# Patient Record
Sex: MALE | Race: WHITE | Employment: FULL TIME | URBAN - METROPOLITAN AREA
[De-identification: names, ages, dates, MRNs, and addresses within clinical notes are randomized per-mention and may not be internally consistent; named-entity substitution may affect disease eponyms.]

---

## 2018-04-11 ENCOUNTER — OFFICE VISIT (OUTPATIENT)
Dept: FAMILY MEDICINE CLINIC | Age: 23
End: 2018-04-11
Payer: COMMERCIAL

## 2018-04-11 VITALS
BODY MASS INDEX: 25.18 KG/M2 | SYSTOLIC BLOOD PRESSURE: 120 MMHG | OXYGEN SATURATION: 97 % | HEIGHT: 69 IN | DIASTOLIC BLOOD PRESSURE: 80 MMHG | HEART RATE: 87 BPM | WEIGHT: 170 LBS | RESPIRATION RATE: 18 BRPM | TEMPERATURE: 98.5 F

## 2018-04-11 DIAGNOSIS — J10.1 INFLUENZA A: Primary | ICD-10-CM

## 2018-04-11 LAB
INFLUENZA A ANTIBODY: POSITIVE
INFLUENZA B ANTIBODY: NEGATIVE

## 2018-04-11 PROCEDURE — 87804 INFLUENZA ASSAY W/OPTIC: CPT | Performed by: FAMILY MEDICINE

## 2018-04-11 PROCEDURE — 99213 OFFICE O/P EST LOW 20 MIN: CPT | Performed by: FAMILY MEDICINE

## 2018-04-11 RX ORDER — OSELTAMIVIR PHOSPHATE 75 MG/1
75 CAPSULE ORAL 2 TIMES DAILY
Qty: 10 CAPSULE | Refills: 0 | Status: SHIPPED | OUTPATIENT
Start: 2018-04-11 | End: 2018-04-16

## 2018-09-26 ENCOUNTER — OFFICE VISIT (OUTPATIENT)
Dept: FAMILY MEDICINE CLINIC | Age: 23
End: 2018-09-26
Payer: COMMERCIAL

## 2018-09-26 ENCOUNTER — HOSPITAL ENCOUNTER (OUTPATIENT)
Age: 23
Discharge: HOME | End: 2018-09-28
Payer: COMMERCIAL

## 2018-09-26 VITALS
SYSTOLIC BLOOD PRESSURE: 122 MMHG | WEIGHT: 184 LBS | TEMPERATURE: 97.2 F | DIASTOLIC BLOOD PRESSURE: 80 MMHG | RESPIRATION RATE: 18 BRPM | HEIGHT: 69 IN | BODY MASS INDEX: 27.25 KG/M2 | HEART RATE: 104 BPM | OXYGEN SATURATION: 96 %

## 2018-09-26 DIAGNOSIS — J02.9 ACUTE PHARYNGITIS, UNSPECIFIED ETIOLOGY: Primary | ICD-10-CM

## 2018-09-26 DIAGNOSIS — J01.90 ACUTE NON-RECURRENT SINUSITIS, UNSPECIFIED LOCATION: ICD-10-CM

## 2018-09-26 LAB — S PYO AG THROAT QL: NORMAL

## 2018-09-26 PROCEDURE — 99213 OFFICE O/P EST LOW 20 MIN: CPT | Performed by: PHYSICIAN ASSISTANT

## 2018-09-26 PROCEDURE — 87147 CULTURE TYPE IMMUNOLOGIC: CPT

## 2018-09-26 PROCEDURE — 87880 STREP A ASSAY W/OPTIC: CPT | Performed by: PHYSICIAN ASSISTANT

## 2018-09-26 PROCEDURE — 87081 CULTURE SCREEN ONLY: CPT

## 2018-09-26 RX ORDER — AZITHROMYCIN 250 MG/1
TABLET, FILM COATED ORAL
Qty: 1 PACKET | Refills: 0 | Status: SHIPPED | OUTPATIENT
Start: 2018-09-26 | End: 2019-02-27

## 2018-09-26 ASSESSMENT — PATIENT HEALTH QUESTIONNAIRE - PHQ9
1. LITTLE INTEREST OR PLEASURE IN DOING THINGS: 0
2. FEELING DOWN, DEPRESSED OR HOPELESS: 0
SUM OF ALL RESPONSES TO PHQ QUESTIONS 1-9: 0
SUM OF ALL RESPONSES TO PHQ QUESTIONS 1-9: 0
SUM OF ALL RESPONSES TO PHQ9 QUESTIONS 1 & 2: 0

## 2018-09-28 LAB — S PYO THROAT QL CULT: NORMAL

## 2019-02-27 ENCOUNTER — APPOINTMENT (OUTPATIENT)
Dept: CT IMAGING | Age: 24
End: 2019-02-27
Payer: COMMERCIAL

## 2019-02-27 ENCOUNTER — APPOINTMENT (OUTPATIENT)
Dept: GENERAL RADIOLOGY | Age: 24
End: 2019-02-27
Payer: COMMERCIAL

## 2019-02-27 ENCOUNTER — HOSPITAL ENCOUNTER (EMERGENCY)
Age: 24
Discharge: HOME | End: 2019-02-28
Attending: EMERGENCY MEDICINE
Payer: COMMERCIAL

## 2019-02-27 DIAGNOSIS — I10 HYPERTENSION, UNSPECIFIED TYPE: ICD-10-CM

## 2019-02-27 DIAGNOSIS — R07.9 CHEST PAIN, UNSPECIFIED TYPE: Primary | ICD-10-CM

## 2019-02-27 LAB
ACETAMINOPHEN LEVEL: <5 MCG/ML (ref 10–30)
AMPHETAMINE SCREEN, URINE: NOT DETECTED
BARBITURATE SCREEN URINE: NOT DETECTED
BENZODIAZEPINE SCREEN, URINE: NOT DETECTED
CANNABINOID SCREEN URINE: NOT DETECTED
COCAINE METABOLITE SCREEN URINE: NOT DETECTED
D DIMER: <200 NG/ML DDU
ETHANOL: <10 MG/DL (ref 0–0.08)
HCT VFR BLD CALC: 46.9 % (ref 37–54)
HEMOGLOBIN: 16.3 G/DL (ref 12.5–16.5)
MCH RBC QN AUTO: 28.2 PG (ref 26–35)
MCHC RBC AUTO-ENTMCNC: 34.8 % (ref 32–34.5)
MCV RBC AUTO: 81.1 FL (ref 80–99.9)
METHADONE SCREEN, URINE: NOT DETECTED
OPIATE SCREEN URINE: NOT DETECTED
PDW BLD-RTO: 11.9 FL (ref 11.5–15)
PHENCYCLIDINE SCREEN URINE: NOT DETECTED
PLATELET # BLD: 300 E9/L (ref 130–450)
PMV BLD AUTO: 8.1 FL (ref 7–12)
PROPOXYPHENE SCREEN: NOT DETECTED
RBC # BLD: 5.78 E12/L (ref 3.8–5.8)
SALICYLATE, SERUM: 0.4 MG/DL (ref 0–30)
TRICYCLIC ANTIDEPRESSANTS SCREEN SERUM: NEGATIVE NG/ML
TROPONIN: <0.01 NG/ML (ref 0–0.03)
WBC # BLD: 9.4 E9/L (ref 4.5–11.5)

## 2019-02-27 PROCEDURE — 70450 CT HEAD/BRAIN W/O DYE: CPT

## 2019-02-27 PROCEDURE — G0480 DRUG TEST DEF 1-7 CLASSES: HCPCS

## 2019-02-27 PROCEDURE — 80053 COMPREHEN METABOLIC PANEL: CPT

## 2019-02-27 PROCEDURE — 6370000000 HC RX 637 (ALT 250 FOR IP): Performed by: EMERGENCY MEDICINE

## 2019-02-27 PROCEDURE — 93005 ELECTROCARDIOGRAM TRACING: CPT | Performed by: EMERGENCY MEDICINE

## 2019-02-27 PROCEDURE — 36415 COLL VENOUS BLD VENIPUNCTURE: CPT

## 2019-02-27 PROCEDURE — 85378 FIBRIN DEGRADE SEMIQUANT: CPT

## 2019-02-27 PROCEDURE — 84484 ASSAY OF TROPONIN QUANT: CPT

## 2019-02-27 PROCEDURE — 85027 COMPLETE CBC AUTOMATED: CPT

## 2019-02-27 PROCEDURE — 80307 DRUG TEST PRSMV CHEM ANLYZR: CPT

## 2019-02-27 PROCEDURE — 71045 X-RAY EXAM CHEST 1 VIEW: CPT

## 2019-02-27 PROCEDURE — 99285 EMERGENCY DEPT VISIT HI MDM: CPT

## 2019-02-27 RX ORDER — CLONIDINE HYDROCHLORIDE 0.1 MG/1
0.1 TABLET ORAL ONCE
Status: COMPLETED | OUTPATIENT
Start: 2019-02-27 | End: 2019-02-27

## 2019-02-27 RX ADMIN — CLONIDINE HYDROCHLORIDE 0.1 MG: 0.1 TABLET ORAL at 23:35

## 2019-02-28 VITALS
RESPIRATION RATE: 16 BRPM | TEMPERATURE: 98.9 F | SYSTOLIC BLOOD PRESSURE: 137 MMHG | DIASTOLIC BLOOD PRESSURE: 88 MMHG | HEART RATE: 82 BPM | OXYGEN SATURATION: 98 %

## 2019-02-28 LAB
ALBUMIN SERPL-MCNC: 4.9 G/DL (ref 3.5–5.2)
ALP BLD-CCNC: 51 U/L (ref 40–129)
ALT SERPL-CCNC: 16 U/L (ref 0–40)
ANION GAP SERPL CALCULATED.3IONS-SCNC: 13 MMOL/L (ref 7–16)
AST SERPL-CCNC: 19 U/L (ref 0–39)
BILIRUB SERPL-MCNC: 0.9 MG/DL (ref 0–1.2)
BUN BLDV-MCNC: 13 MG/DL (ref 6–20)
CALCIUM SERPL-MCNC: 9.6 MG/DL (ref 8.6–10.2)
CHLORIDE BLD-SCNC: 98 MMOL/L (ref 98–107)
CO2: 28 MMOL/L (ref 22–29)
CREAT SERPL-MCNC: 1 MG/DL (ref 0.7–1.2)
GFR AFRICAN AMERICAN: >60
GFR NON-AFRICAN AMERICAN: >60 ML/MIN/1.73
GLUCOSE BLD-MCNC: 95 MG/DL (ref 74–99)
POTASSIUM SERPL-SCNC: 3.4 MMOL/L (ref 3.5–5)
SODIUM BLD-SCNC: 139 MMOL/L (ref 132–146)
TOTAL PROTEIN: 8 G/DL (ref 6.4–8.3)

## 2019-02-28 RX ORDER — AMLODIPINE BESYLATE 5 MG/1
5 TABLET ORAL DAILY
Qty: 7 TABLET | Refills: 0 | Status: SHIPPED | OUTPATIENT
Start: 2019-02-28 | End: 2019-03-04

## 2019-03-01 LAB
EKG ATRIAL RATE: 88 BPM
EKG P AXIS: 70 DEGREES
EKG P-R INTERVAL: 140 MS
EKG Q-T INTERVAL: 338 MS
EKG QRS DURATION: 80 MS
EKG QTC CALCULATION (BAZETT): 408 MS
EKG R AXIS: 73 DEGREES
EKG T AXIS: 17 DEGREES
EKG VENTRICULAR RATE: 88 BPM

## 2019-03-04 ENCOUNTER — OFFICE VISIT (OUTPATIENT)
Dept: FAMILY MEDICINE CLINIC | Age: 24
End: 2019-03-04
Payer: COMMERCIAL

## 2019-03-04 VITALS
BODY MASS INDEX: 28.55 KG/M2 | HEIGHT: 68 IN | HEART RATE: 98 BPM | SYSTOLIC BLOOD PRESSURE: 138 MMHG | RESPIRATION RATE: 16 BRPM | OXYGEN SATURATION: 97 % | DIASTOLIC BLOOD PRESSURE: 80 MMHG | WEIGHT: 188.4 LBS | TEMPERATURE: 98.5 F

## 2019-03-04 DIAGNOSIS — F41.9 ANXIETY: Primary | ICD-10-CM

## 2019-03-04 PROCEDURE — 99213 OFFICE O/P EST LOW 20 MIN: CPT | Performed by: PHYSICIAN ASSISTANT

## 2019-03-04 RX ORDER — CITALOPRAM 10 MG/1
10 TABLET ORAL DAILY
Qty: 30 TABLET | Refills: 0 | Status: SHIPPED | OUTPATIENT
Start: 2019-03-04 | End: 2019-03-15

## 2019-03-04 ASSESSMENT — PATIENT HEALTH QUESTIONNAIRE - PHQ9
SUM OF ALL RESPONSES TO PHQ QUESTIONS 1-9: 0
SUM OF ALL RESPONSES TO PHQ QUESTIONS 1-9: 0
1. LITTLE INTEREST OR PLEASURE IN DOING THINGS: 0
2. FEELING DOWN, DEPRESSED OR HOPELESS: 0
SUM OF ALL RESPONSES TO PHQ9 QUESTIONS 1 & 2: 0

## 2019-03-05 ASSESSMENT — ENCOUNTER SYMPTOMS
NAUSEA: 0
SINUS PAIN: 0
ABDOMINAL PAIN: 0
DIARRHEA: 0
SINUS PRESSURE: 0
VOMITING: 0
COUGH: 0
SHORTNESS OF BREATH: 0
SORE THROAT: 0

## 2019-03-14 ENCOUNTER — TELEPHONE (OUTPATIENT)
Dept: FAMILY MEDICINE CLINIC | Age: 24
End: 2019-03-14

## 2019-03-15 RX ORDER — CITALOPRAM 20 MG/1
20 TABLET ORAL DAILY
Qty: 30 TABLET | Refills: 0 | Status: SHIPPED | OUTPATIENT
Start: 2019-03-15 | End: 2019-03-29 | Stop reason: SDUPTHER

## 2019-03-29 ENCOUNTER — OFFICE VISIT (OUTPATIENT)
Dept: FAMILY MEDICINE CLINIC | Age: 24
End: 2019-03-29
Payer: COMMERCIAL

## 2019-03-29 VITALS
OXYGEN SATURATION: 97 % | DIASTOLIC BLOOD PRESSURE: 80 MMHG | RESPIRATION RATE: 18 BRPM | HEART RATE: 79 BPM | SYSTOLIC BLOOD PRESSURE: 124 MMHG | WEIGHT: 187 LBS | HEIGHT: 68 IN | BODY MASS INDEX: 28.34 KG/M2 | TEMPERATURE: 97.9 F

## 2019-03-29 DIAGNOSIS — F41.9 ANXIETY: ICD-10-CM

## 2019-03-29 PROCEDURE — 99213 OFFICE O/P EST LOW 20 MIN: CPT | Performed by: FAMILY MEDICINE

## 2019-03-29 RX ORDER — CITALOPRAM 20 MG/1
20 TABLET ORAL DAILY
Qty: 30 TABLET | Refills: 5 | Status: SHIPPED | OUTPATIENT
Start: 2019-03-29 | End: 2019-05-21 | Stop reason: SDUPTHER

## 2019-03-29 NOTE — PROGRESS NOTES
4/1/2019    Chief Complaint   Patient presents with    Anxiety     1 month medication check up          Review of Systems   Constitutional: Positive for activity change. Negative for diaphoresis. HENT: Negative. Eyes: Negative. Respiratory: Negative for cough, choking, chest tightness and shortness of breath. Gastrointestinal: Negative for abdominal distention and abdominal pain. Endocrine: Negative. Genitourinary: Negative. Musculoskeletal: Negative for arthralgias, back pain, gait problem and joint swelling. Skin: Negative for color change and pallor. Hematological: Negative. Psychiatric/Behavioral: Positive for sleep disturbance. Negative for self-injury and suicidal ideas. The patient is nervous/anxious. Physical Exam   Constitutional: He appears well-developed and well-nourished. HENT:   Head: Normocephalic. Eyes: Pupils are equal, round, and reactive to light. Neck: Normal range of motion. Cardiovascular: Normal rate, regular rhythm and normal heart sounds. Pulmonary/Chest: Effort normal and breath sounds normal.   Abdominal: Soft. Bowel sounds are normal.   Musculoskeletal: Normal range of motion. Neurological: He is alert. Psychiatric: He has a normal mood and affect.  His behavior is normal. Judgment and thought content normal.     1. Anxiety  Started on celexa 10 2 weeks ago  No side effects  Is feeling better  rechecl 4 weeks

## 2019-04-01 PROBLEM — F41.9 ANXIETY: Status: ACTIVE | Noted: 2019-04-01

## 2019-04-01 ASSESSMENT — ENCOUNTER SYMPTOMS
EYES NEGATIVE: 1
CHOKING: 0
SHORTNESS OF BREATH: 0
ABDOMINAL PAIN: 0
CHEST TIGHTNESS: 0
BACK PAIN: 0
ABDOMINAL DISTENTION: 0
COUGH: 0
COLOR CHANGE: 0

## 2019-05-22 RX ORDER — CITALOPRAM 20 MG/1
20 TABLET ORAL DAILY
Qty: 90 TABLET | Refills: 1 | Status: SHIPPED | OUTPATIENT
Start: 2019-05-22 | End: 2019-08-29 | Stop reason: SDUPTHER

## 2019-06-11 ENCOUNTER — TELEPHONE (OUTPATIENT)
Dept: FAMILY MEDICINE CLINIC | Age: 24
End: 2019-06-11

## 2019-06-11 RX ORDER — AZITHROMYCIN 500 MG/1
500 TABLET, FILM COATED ORAL DAILY
Qty: 6 TABLET | Refills: 0 | Status: SHIPPED | OUTPATIENT
Start: 2019-06-11 | End: 2019-06-17

## 2019-06-11 NOTE — TELEPHONE ENCOUNTER
His girlfriend tested Positive yesterday for Strep. Wanted to know if you would call something in for him because he has a sore throat also. [facility] [address].

## 2019-08-29 RX ORDER — CITALOPRAM 20 MG/1
20 TABLET ORAL DAILY
Qty: 30 TABLET | Refills: 0 | Status: SHIPPED | OUTPATIENT
Start: 2019-08-29 | End: 2019-09-27 | Stop reason: SDUPTHER

## 2019-09-27 ENCOUNTER — OFFICE VISIT (OUTPATIENT)
Dept: FAMILY MEDICINE CLINIC | Age: 24
End: 2019-09-27
Payer: COMMERCIAL

## 2019-09-27 VITALS
TEMPERATURE: 98.2 F | OXYGEN SATURATION: 99 % | DIASTOLIC BLOOD PRESSURE: 82 MMHG | RESPIRATION RATE: 16 BRPM | WEIGHT: 194.4 LBS | SYSTOLIC BLOOD PRESSURE: 138 MMHG | BODY MASS INDEX: 29.56 KG/M2 | HEART RATE: 80 BPM

## 2019-09-27 DIAGNOSIS — R45.4 EXCESSIVE ANGER: ICD-10-CM

## 2019-09-27 DIAGNOSIS — F41.9 ANXIETY: Primary | ICD-10-CM

## 2019-09-27 PROCEDURE — 99213 OFFICE O/P EST LOW 20 MIN: CPT | Performed by: FAMILY MEDICINE

## 2019-09-27 RX ORDER — CITALOPRAM 40 MG/1
40 TABLET ORAL DAILY
Qty: 90 TABLET | Refills: 1 | Status: SHIPPED
Start: 2019-09-27 | End: 2020-02-14 | Stop reason: SDUPTHER

## 2019-09-27 ASSESSMENT — ENCOUNTER SYMPTOMS
RESPIRATORY NEGATIVE: 1
GASTROINTESTINAL NEGATIVE: 1

## 2019-09-27 NOTE — PROGRESS NOTES
strain: Not on file    Food insecurity:     Worry: Not on file     Inability: Not on file    Transportation needs:     Medical: Not on file     Non-medical: Not on file   Tobacco Use    Smoking status: Never Smoker    Smokeless tobacco: Former User   Substance and Sexual Activity    Alcohol use: Yes    Drug use: No    Sexual activity: Yes     Partners: Female   Lifestyle    Physical activity:     Days per week: Not on file     Minutes per session: Not on file    Stress: Not on file   Relationships    Social connections:     Talks on phone: Not on file     Gets together: Not on file     Attends Quaker service: Not on file     Active member of club or organization: Not on file     Attends meetings of clubs or organizations: Not on file     Relationship status: Not on file    Intimate partner violence:     Fear of current or ex partner: Not on file     Emotionally abused: Not on file     Physically abused: Not on file     Forced sexual activity: Not on file   Other Topics Concern    Not on file   Social History Narrative    Not on file           Scheduled Meds:  Current Outpatient Medications   Medication Sig Dispense Refill    citalopram (CELEXA) 40 MG tablet Take 1 tablet by mouth daily 90 tablet 1     No current facility-administered medications for this visit. Wt Readings from Last 3 Encounters:   09/27/19 194 lb 6.4 oz (88.2 kg)   03/29/19 187 lb (84.8 kg)   03/04/19 188 lb 6.4 oz (85.5 kg)         Vitals:    09/27/19 1611   BP: 138/82   Pulse:    Resp:    Temp:    SpO2:          Physical Exam   Constitutional: He is oriented to person, place, and time. He appears well-developed and well-nourished. HENT:   Head: Normocephalic. Eyes: Pupils are equal, round, and reactive to light. Neck: Normal range of motion. Cardiovascular: Normal rate and regular rhythm. Pulmonary/Chest: Effort normal and breath sounds normal.   Abdominal: Soft.  Bowel sounds are normal.

## 2019-12-30 ENCOUNTER — OFFICE VISIT (OUTPATIENT)
Dept: FAMILY MEDICINE CLINIC | Age: 24
End: 2019-12-30
Payer: COMMERCIAL

## 2019-12-30 VITALS
OXYGEN SATURATION: 96 % | HEART RATE: 82 BPM | RESPIRATION RATE: 16 BRPM | SYSTOLIC BLOOD PRESSURE: 126 MMHG | DIASTOLIC BLOOD PRESSURE: 80 MMHG | HEIGHT: 68 IN | TEMPERATURE: 97.8 F | WEIGHT: 197.2 LBS | BODY MASS INDEX: 29.89 KG/M2

## 2019-12-30 PROCEDURE — 99213 OFFICE O/P EST LOW 20 MIN: CPT | Performed by: FAMILY MEDICINE

## 2019-12-30 NOTE — PROGRESS NOTES
1/2/2020    Chief Complaint   Patient presents with    Anxiety     Pt here for 3 month medication check              Patient Active Problem List    Diagnosis Date Noted    Excessive anger 09/27/2019    Anxiety 04/01/2019     Subjective    On celexa and feeling better but readily admits he has anger issues, stemming from how he grew up  Does not feel counseling would help    States he left [place] party because someone set him off  He just feels that this is how he is and not changeable. 1. Excessive anger      2.  Anxiety          Goals    None       Goals      Review of Systems     Review Of Systems    General:   No weight change no malaise no fatigue no change in appetite no sleep disturbance nofever/chills no night sweats  Skin:                no abnormal pigmentation, no rash, no scaling,no itching, no Masses,no  hair , no nail changes  Eyes:               no blurring, no diplopia, no  eye pain no glaucoma no cataracts  ENT:                 no hearing loss,no  Tinnitus,no  Vertigo,no osebleed, no nasal congestion, no rhinorrhea, no sore throat , no jaw adam. no hoarseness,  no bleeding    Neck:     no node tenderness , not rigid, no masses   Respiratory:           no cough, no sputum, No coughing blood, no pleuritic , no chest pain, no dyspnea,  no wheezing  Cardiovascular:         no angina, No chest pain  No syncope, no pedal edema , no orthopnea, no PND, no palpitations, no claudication  Gastrointestinal  no nausea, no vomiting, no heartburn, no diarrhea, no constipation, no bloating,  no abdominal pain, no rectal pain, no bleeding no hemorrhoids, no hernia  no urinary urgency, no frequency, no dysuria, no nocturia, no hesitancy, no  Incontinence, no bleeding, no stones  Musculoskeletal:        no arthritis, no  arthralgia, no myalgia, no  weakness,  no morning stiffness, no joint swelling   Neurologic:                 no paralysis, no paresis, no  paresthesia, no seizures, no  tremors, no headaches, no tumors , no stroke, no speech issues,  No incoordination, no head trauma, no memory loss/concentration  Hematologic:              no anemia, no abnormal bleeding/bruising, no fever, no chills, no night sweats, no wollen glands, no unexplained weight loss  Endocrine:        no heat or cold intolerance and no polyphagia, polydipsia,  or polyuria  Psych:   No depression no anxiety, no suicidal or homicidal thoughts, no irritability, no decreased energy, no trouble falling asleep, no early awakening , no trouble concentrating             Objective:    No family history on file. No past medical history on file. Social History     Socioeconomic History    Marital status: Single     Spouse name: Not on file    Number of children: Not on file    Years of education: Not on file    Highest education level: Not on file   Occupational History    Not on file   Social Needs    Financial resource strain: Not on file    Food insecurity:     Worry: Not on file     Inability: Not on file    Transportation needs:     Medical: Not on file     Non-medical: Not on file   Tobacco Use    Smoking status: Never Smoker    Smokeless tobacco: Former User   Substance and Sexual Activity    Alcohol use:  Yes    Drug use: No    Sexual activity: Yes     Partners: Female   Lifestyle    Physical activity:     Days per week: Not on file     Minutes per session: Not on file    Stress: Not on file   Relationships    Social connections:     Talks on phone: Not on file     Gets together: Not on file     Attends Christian service: Not on file     Active member of club or organization: Not on file     Attends meetings of clubs or organizations: Not on file     Relationship status: Not on file    Intimate partner violence:     Fear of current or ex partner: Not on file     Emotionally abused: Not on file     Physically abused: Not on file     Forced sexual activity: Not on file   Other Topics Concern    Not on file   Social History Narrative    Not on file           Scheduled Meds:  Current Outpatient Medications   Medication Sig Dispense Refill    citalopram (CELEXA) 40 MG tablet Take 1 tablet by mouth daily 90 tablet 1     No current facility-administered medications for this visit.                 Wt Readings from Last 3 Encounters:   12/30/19 197 lb 3.2 oz (89.4 kg)   09/27/19 194 lb 6.4 oz (88.2 kg)   03/29/19 187 lb (84.8 kg)         Vitals:    12/30/19 1658   BP: 126/80   Pulse: 82   Resp: 16   Temp: 97.8 °F (36.6 °C)   SpO2: 96%         Physical Exam    Physical Exam  General Appearance: alert and oriented to person, place and time, well developed and well- nourished, in no acute distress  Skin:    warm and dry, no rash or erythema  Head:    normocephalic and atraumatic  Eyes:    pupils equal, round, and reactive to light, extraocular eye movements intact, conjunctivae normal  ENT:    tympanic membrane, external ear and ear canal normal bilaterally, nose without deformity, nasal mucosa and turbinates     normal without polyps  Neck:    supple and non-tender without mass, no thyromegaly or thyroid nodules, no cervical lymphadenopathy  Pulmonary/Chest:  clear to auscultation bilaterally- no wheezes, rales or rhonchi, normal air movement, no respiratory distress  Cardiovascular:  normal rate, regular rhythm, , no murmurs, rubs, clicks, or gallops, distal pulses intact, no carotid bruits  Abdomen:   soft, non-tender, non-distended, normal bowel sounds, no masses or organomegaly, normal palpation  Extremities:   no cyanosis, clubbing or edema, pedal pulses normal  Musculoskeletal:  normal range of motion, no joint swelling, deformity or tenderness, Back normal, no kyphosis, no cva tenderness,  Head and neck normal rom, shoulders normal strength ,  hips normal , gait normal , no cane no walker , no motor deficits,  Neurologic:   reflexes normal and symmetric, no cranial nerve deficit, gait, coordination and speech normal, A & O x 3, Moves all extremities,  No gross neuro deficits ,normal DTR, normal memory, normal judgement/insight, normal affect                                       Assessment:    1. Excessive anger  Advise counseling and card given    2. Anxiety  Continue meds recheck 6 mos                        4. Reviewed labs    5. Return in about 6 months (around 6/30/2020).            Augusto Canavan M.D.    1/2/2020

## 2020-02-14 RX ORDER — CITALOPRAM 40 MG/1
40 TABLET ORAL DAILY
Qty: 90 TABLET | Refills: 1 | Status: SHIPPED
Start: 2020-02-14 | End: 2020-05-22 | Stop reason: SDUPTHER

## 2020-05-22 ENCOUNTER — VIRTUAL VISIT (OUTPATIENT)
Dept: FAMILY MEDICINE CLINIC | Age: 25
End: 2020-05-22
Payer: COMMERCIAL

## 2020-05-22 PROCEDURE — 99213 OFFICE O/P EST LOW 20 MIN: CPT | Performed by: FAMILY MEDICINE

## 2020-05-22 RX ORDER — CITALOPRAM 40 MG/1
40 TABLET ORAL DAILY
Qty: 90 TABLET | Refills: 1 | Status: SHIPPED
Start: 2020-05-22 | End: 2020-10-15 | Stop reason: SDUPTHER

## 2020-06-01 ASSESSMENT — ENCOUNTER SYMPTOMS: GASTROINTESTINAL NEGATIVE: 1

## 2020-10-15 RX ORDER — CITALOPRAM 40 MG/1
40 TABLET ORAL DAILY
Qty: 90 TABLET | Refills: 1 | Status: SHIPPED
Start: 2020-10-15 | End: 2021-02-08 | Stop reason: SDUPTHER

## 2021-02-08 RX ORDER — CITALOPRAM 40 MG/1
40 TABLET ORAL DAILY
Qty: 90 TABLET | Refills: 0 | Status: SHIPPED
Start: 2021-02-08 | End: 2022-07-08

## 2022-07-08 ENCOUNTER — OFFICE VISIT (OUTPATIENT)
Dept: FAMILY MEDICINE CLINIC | Age: 27
End: 2022-07-08
Payer: COMMERCIAL

## 2022-07-08 VITALS
DIASTOLIC BLOOD PRESSURE: 70 MMHG | HEIGHT: 68 IN | BODY MASS INDEX: 31.28 KG/M2 | WEIGHT: 206.4 LBS | TEMPERATURE: 97.7 F | OXYGEN SATURATION: 97 % | HEART RATE: 86 BPM | SYSTOLIC BLOOD PRESSURE: 124 MMHG

## 2022-07-08 DIAGNOSIS — F41.9 ANXIETY: ICD-10-CM

## 2022-07-08 DIAGNOSIS — I10 HYPERTENSION, UNSPECIFIED TYPE: ICD-10-CM

## 2022-07-08 DIAGNOSIS — F41.9 ANXIETY: Primary | ICD-10-CM

## 2022-07-08 LAB
BASOPHILS ABSOLUTE: 0.05 E9/L (ref 0–0.2)
BASOPHILS RELATIVE PERCENT: 0.6 % (ref 0–2)
EOSINOPHILS ABSOLUTE: 0.24 E9/L (ref 0.05–0.5)
EOSINOPHILS RELATIVE PERCENT: 2.9 % (ref 0–6)
HCT VFR BLD CALC: 45.8 % (ref 37–54)
HEMOGLOBIN: 15.4 G/DL (ref 12.5–16.5)
IMMATURE GRANULOCYTES #: 0.03 E9/L
IMMATURE GRANULOCYTES %: 0.4 % (ref 0–5)
LYMPHOCYTES ABSOLUTE: 1.87 E9/L (ref 1.5–4)
LYMPHOCYTES RELATIVE PERCENT: 22.3 % (ref 20–42)
MCH RBC QN AUTO: 27.9 PG (ref 26–35)
MCHC RBC AUTO-ENTMCNC: 33.6 % (ref 32–34.5)
MCV RBC AUTO: 83 FL (ref 80–99.9)
MONOCYTES ABSOLUTE: 0.74 E9/L (ref 0.1–0.95)
MONOCYTES RELATIVE PERCENT: 8.8 % (ref 2–12)
NEUTROPHILS ABSOLUTE: 5.47 E9/L (ref 1.8–7.3)
NEUTROPHILS RELATIVE PERCENT: 65 % (ref 43–80)
PDW BLD-RTO: 12.4 FL (ref 11.5–15)
PLATELET # BLD: 297 E9/L (ref 130–450)
PMV BLD AUTO: 9.3 FL (ref 7–12)
RBC # BLD: 5.52 E12/L (ref 3.8–5.8)
WBC # BLD: 8.4 E9/L (ref 4.5–11.5)

## 2022-07-08 PROCEDURE — 99213 OFFICE O/P EST LOW 20 MIN: CPT | Performed by: FAMILY MEDICINE

## 2022-07-08 PROCEDURE — 93000 ELECTROCARDIOGRAM COMPLETE: CPT | Performed by: FAMILY MEDICINE

## 2022-07-08 RX ORDER — METOPROLOL SUCCINATE 50 MG/1
50 TABLET, EXTENDED RELEASE ORAL DAILY
Qty: 30 TABLET | Refills: 3 | Status: SHIPPED
Start: 2022-07-08 | End: 2022-09-02 | Stop reason: SDUPTHER

## 2022-07-08 SDOH — ECONOMIC STABILITY: HOUSING INSECURITY
IN THE LAST 12 MONTHS, WAS THERE A TIME WHEN YOU DID NOT HAVE A STEADY PLACE TO SLEEP OR SLEPT IN A SHELTER (INCLUDING NOW)?: NO

## 2022-07-08 SDOH — ECONOMIC STABILITY: TRANSPORTATION INSECURITY
IN THE PAST 12 MONTHS, HAS LACK OF TRANSPORTATION KEPT YOU FROM MEETINGS, WORK, OR FROM GETTING THINGS NEEDED FOR DAILY LIVING?: NO

## 2022-07-08 SDOH — ECONOMIC STABILITY: INCOME INSECURITY: IN THE LAST 12 MONTHS, WAS THERE A TIME WHEN YOU WERE NOT ABLE TO PAY THE MORTGAGE OR RENT ON TIME?: NO

## 2022-07-08 SDOH — ECONOMIC STABILITY: FOOD INSECURITY: WITHIN THE PAST 12 MONTHS, THE FOOD YOU BOUGHT JUST DIDN'T LAST AND YOU DIDN'T HAVE MONEY TO GET MORE.: NEVER TRUE

## 2022-07-08 SDOH — ECONOMIC STABILITY: TRANSPORTATION INSECURITY
IN THE PAST 12 MONTHS, HAS THE LACK OF TRANSPORTATION KEPT YOU FROM MEDICAL APPOINTMENTS OR FROM GETTING MEDICATIONS?: NO

## 2022-07-08 SDOH — ECONOMIC STABILITY: HOUSING INSECURITY: IN THE LAST 12 MONTHS, HOW MANY PLACES HAVE YOU LIVED?: 1

## 2022-07-08 SDOH — ECONOMIC STABILITY: FOOD INSECURITY: WITHIN THE PAST 12 MONTHS, YOU WORRIED THAT YOUR FOOD WOULD RUN OUT BEFORE YOU GOT MONEY TO BUY MORE.: NEVER TRUE

## 2022-07-08 ASSESSMENT — PATIENT HEALTH QUESTIONNAIRE - PHQ9
SUM OF ALL RESPONSES TO PHQ9 QUESTIONS 1 & 2: 0
SUM OF ALL RESPONSES TO PHQ QUESTIONS 1-9: 0
2. FEELING DOWN, DEPRESSED OR HOPELESS: 0
1. LITTLE INTEREST OR PLEASURE IN DOING THINGS: 0
SUM OF ALL RESPONSES TO PHQ QUESTIONS 1-9: 0

## 2022-07-08 ASSESSMENT — SOCIAL DETERMINANTS OF HEALTH (SDOH): HOW HARD IS IT FOR YOU TO PAY FOR THE VERY BASICS LIKE FOOD, HOUSING, MEDICAL CARE, AND HEATING?: NOT HARD AT ALL

## 2022-07-08 NOTE — PROGRESS NOTES
2022    No current outpatient medications on file. No current facility-administered medications for this visit. Georges Brown (: 1995 IS A 27 y.o. 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 Anxiety  Does not want to go back on celexa as he doesn't think this helped  He feels his relationship  - CBC with Auto Differential; Future  - Comprehensive Metabolic Panel; Future  - TSH; Future   With girlfriend and just getting older has enabled him to  be in better control    2. Hypertension, unspecified type  Vitals:    22 1520   BP: 124/70   Pulse: 86   Temp: 97.7 °F (36.5 °C)   SpO2: 97%       - EKG 12 Lead  - CBC with Auto Differential; Future  - Comprehensive Metabolic Panel; Future  - TSH; Future  - metoprolol succinate (TOPROL XL) 50 MG extended release tablet; Take 1 tablet by mouth daily  Dispense: 30 tablet; Refill: 3    Use toprol to help with decreaseing anxiety and heart rate and bp              SUBJECTIVE    Review of Systems   Constitutional:  Negative for activity change, appetite change, chills, diaphoresis, fatigue, fever and unexpected weight change. HENT:  Negative for hearing loss, sinus pressure, sore throat, tinnitus, trouble swallowing and voice change. Eyes:  Negative for photophobia and visual disturbance. Respiratory:  Negative for cough, chest tightness, shortness of breath and wheezing. Cardiovascular:  Negative for chest pain, palpitations and leg swelling. Gastrointestinal:  Negative for abdominal pain, blood in stool, constipation, diarrhea, nausea and vomiting. Endocrine: Negative for cold intolerance, heat intolerance, polydipsia, polyphagia and polyuria. Genitourinary:  Negative for difficulty urinating, frequency, hematuria and urgency. Musculoskeletal:  Negative for back pain, joint swelling and myalgias. Allergic/Immunologic: Negative for environmental allergies, food allergies and immunocompromised state. Neurological:  Negative for dizziness, weakness, numbness and headaches. Hematological:  Negative for adenopathy. Does not bruise/bleed easily. Psychiatric/Behavioral:  Negative for agitation, behavioral problems, confusion, decreased concentration and sleep disturbance. The patient is not nervous/anxious. All other systems reviewed and are negative. OBJECTIVE    Wt Readings from Last 3 Encounters:   07/08/22 206 lb 6.4 oz (93.6 kg)   12/30/19 197 lb 3.2 oz (89.4 kg)   09/27/19 194 lb 6.4 oz (88.2 kg)       Vitals:    07/08/22 1520   BP: 124/70   Pulse: 86   Temp: 97.7 °F (36.5 °C)   SpO2: 97%       Physical Exam  Vitals and nursing note reviewed. Constitutional:       Appearance: Normal appearance. Cardiovascular:      Rate and Rhythm: Normal rate and regular rhythm. Pulses: Normal pulses. Heart sounds: Normal heart sounds. Pulmonary:      Effort: Pulmonary effort is normal. No respiratory distress. Breath sounds: Normal breath sounds. Abdominal:      General: Bowel sounds are normal. There is no distension. Palpations: Abdomen is soft. Tenderness: There is no abdominal tenderness. Musculoskeletal:         General: No swelling or tenderness. Cervical back: Normal range of motion. No rigidity. Skin:     General: Skin is warm and dry. Neurological:      General: No focal deficit present. Mental Status: He is alert. Psychiatric:         Mood and Affect: Mood normal.         Behavior: Behavior normal.         Thought Content:  Thought content normal.         Judgment: Judgment normal.       No follow-ups on file. An electronic signature was used to authenticate this note.     Hilaria Cain, LUANND    7/8/2022

## 2022-07-09 LAB
ALBUMIN SERPL-MCNC: 4.5 G/DL (ref 3.5–5.2)
ALP BLD-CCNC: 56 U/L (ref 40–129)
ALT SERPL-CCNC: 23 U/L (ref 0–40)
ANION GAP SERPL CALCULATED.3IONS-SCNC: 11 MMOL/L (ref 7–16)
AST SERPL-CCNC: 19 U/L (ref 0–39)
BILIRUB SERPL-MCNC: 0.7 MG/DL (ref 0–1.2)
BUN BLDV-MCNC: 16 MG/DL (ref 6–20)
CALCIUM SERPL-MCNC: 9.2 MG/DL (ref 8.6–10.2)
CHLORIDE BLD-SCNC: 107 MMOL/L (ref 98–107)
CO2: 27 MMOL/L (ref 22–29)
CREAT SERPL-MCNC: 1 MG/DL (ref 0.7–1.2)
GFR AFRICAN AMERICAN: >60
GFR NON-AFRICAN AMERICAN: >60 ML/MIN/1.73
GLUCOSE BLD-MCNC: 73 MG/DL (ref 74–99)
POTASSIUM SERPL-SCNC: 4.1 MMOL/L (ref 3.5–5)
SODIUM BLD-SCNC: 145 MMOL/L (ref 132–146)
TOTAL PROTEIN: 7.4 G/DL (ref 6.4–8.3)
TSH SERPL DL<=0.05 MIU/L-ACNC: 0.82 UIU/ML (ref 0.27–4.2)

## 2022-07-16 ASSESSMENT — ENCOUNTER SYMPTOMS
NAUSEA: 0
VOMITING: 0
COUGH: 0
TROUBLE SWALLOWING: 0
SHORTNESS OF BREATH: 0
WHEEZING: 0
SORE THROAT: 0
SINUS PRESSURE: 0
BLOOD IN STOOL: 0
VOICE CHANGE: 0
DIARRHEA: 0
BACK PAIN: 0
CONSTIPATION: 0
PHOTOPHOBIA: 0
CHEST TIGHTNESS: 0
ABDOMINAL PAIN: 0

## 2022-08-05 ENCOUNTER — OFFICE VISIT (OUTPATIENT)
Dept: FAMILY MEDICINE CLINIC | Age: 27
End: 2022-08-05
Payer: COMMERCIAL

## 2022-08-05 VITALS
HEART RATE: 78 BPM | OXYGEN SATURATION: 97 % | WEIGHT: 205.4 LBS | DIASTOLIC BLOOD PRESSURE: 84 MMHG | HEIGHT: 68 IN | RESPIRATION RATE: 16 BRPM | TEMPERATURE: 97.3 F | SYSTOLIC BLOOD PRESSURE: 126 MMHG | BODY MASS INDEX: 31.13 KG/M2

## 2022-08-05 DIAGNOSIS — F41.9 ANXIETY: Primary | ICD-10-CM

## 2022-08-05 DIAGNOSIS — R45.4 EXCESSIVE ANGER: ICD-10-CM

## 2022-08-05 PROCEDURE — 99213 OFFICE O/P EST LOW 20 MIN: CPT | Performed by: FAMILY MEDICINE

## 2022-08-05 NOTE — PROGRESS NOTES
2022    Current Outpatient Medications   Medication Sig Dispense Refill    metoprolol succinate (TOPROL XL) 50 MG extended release tablet Take 1 tablet by mouth daily 30 tablet 3     No current facility-administered medications for this visit. Kevin Basilio (: 1995 IS A 32 y.o. male ,Established patient, here for eval of Anxiety (Pt is here today to f/u on metoprolol. Pt states he is unsure if the medication is helping at this time. States he has noticed decreased anger. HM reviewed. Blood work was completed on . )    Ends day betwenn -5-6    Very busy but  maybe noting a decrease in anxiety  Ntot ready to try anti depressant  States his girlfreiend noticies some improvement    ASSESSMENT / PLAN      1. Anxiety  Stable, cont meds recheck 6 mos      2. Excessive anger  Stable, cont meds recheck 6 mos              SUBJECTIVE    Review of Systems   Constitutional:  Negative for activity change, appetite change, chills, diaphoresis, fatigue, fever and unexpected weight change. HENT:  Negative for hearing loss, sinus pressure, sore throat, tinnitus, trouble swallowing and voice change. Eyes:  Negative for photophobia and visual disturbance. Respiratory:  Negative for cough, chest tightness, shortness of breath and wheezing. Cardiovascular:  Negative for chest pain, palpitations and leg swelling. Gastrointestinal:  Negative for abdominal pain, blood in stool, constipation, diarrhea, nausea and vomiting. Endocrine: Negative for cold intolerance, heat intolerance, polydipsia, polyphagia and polyuria. Genitourinary:  Negative for difficulty urinating, frequency, hematuria and urgency. Musculoskeletal:  Negative for back pain, joint swelling and myalgias. Allergic/Immunologic: Negative for environmental allergies, food allergies and immunocompromised state. Neurological:  Negative for dizziness, weakness, numbness and headaches. Hematological:  Negative for adenopathy.  Does not bruise/bleed easily. Psychiatric/Behavioral:  Negative for agitation, behavioral problems, confusion, decreased concentration and sleep disturbance. The patient is not nervous/anxious. All other systems reviewed and are negative. OBJECTIVE    Wt Readings from Last 3 Encounters:   08/05/22 205 lb 6.4 oz (93.2 kg)   07/08/22 206 lb 6.4 oz (93.6 kg)   12/30/19 197 lb 3.2 oz (89.4 kg)       Vitals:    08/05/22 1427   BP: 126/84   Pulse: 78   Resp: 16   Temp: 97.3 °F (36.3 °C)   SpO2: 97%       Physical Exam  Vitals and nursing note reviewed. Constitutional:       Appearance: Normal appearance. Cardiovascular:      Rate and Rhythm: Normal rate and regular rhythm. Pulses: Normal pulses. Heart sounds: Normal heart sounds. Pulmonary:      Effort: Pulmonary effort is normal. No respiratory distress. Breath sounds: Normal breath sounds. Abdominal:      General: Bowel sounds are normal. There is no distension. Palpations: Abdomen is soft. Tenderness: no abdominal tenderness   Musculoskeletal:         General: No swelling or tenderness. Cervical back: Normal range of motion. No rigidity. Neurological:      General: No focal deficit present. Mental Status: He is alert. Psychiatric:         Mood and Affect: Mood normal.         Behavior: Behavior normal.         Thought Content: Thought content normal.         Judgment: Judgment normal.       Return in about 3 months (around 11/5/2022). An electronic signature was used to authenticate this note.     Rony Rizzo, CLARA    8/25/2022

## 2022-08-25 ASSESSMENT — ENCOUNTER SYMPTOMS
DIARRHEA: 0
CHEST TIGHTNESS: 0
VOMITING: 0
TROUBLE SWALLOWING: 0
ABDOMINAL PAIN: 0
WHEEZING: 0
SHORTNESS OF BREATH: 0
SINUS PRESSURE: 0
SORE THROAT: 0
CONSTIPATION: 0
COUGH: 0
PHOTOPHOBIA: 0
BACK PAIN: 0
NAUSEA: 0
VOICE CHANGE: 0
BLOOD IN STOOL: 0

## 2022-09-01 DIAGNOSIS — I10 HYPERTENSION, UNSPECIFIED TYPE: ICD-10-CM

## 2022-09-01 NOTE — TELEPHONE ENCOUNTER
Medication Refill Request    LOV Visit date not found  NOV Visit date not found    Lab Results   Component Value Date    CREATININE 1.0 07/08/2022

## 2022-09-02 RX ORDER — METOPROLOL SUCCINATE 50 MG/1
50 TABLET, EXTENDED RELEASE ORAL DAILY
Qty: 30 TABLET | Refills: 1 | Status: SHIPPED | OUTPATIENT
Start: 2022-09-02

## 2022-11-13 DIAGNOSIS — I10 HYPERTENSION, UNSPECIFIED TYPE: ICD-10-CM

## 2022-11-14 RX ORDER — METOPROLOL SUCCINATE 50 MG/1
TABLET, EXTENDED RELEASE ORAL
Qty: 30 TABLET | Refills: 1 | Status: SHIPPED | OUTPATIENT
Start: 2022-11-14

## 2023-01-03 ENCOUNTER — OFFICE VISIT (OUTPATIENT)
Dept: FAMILY MEDICINE CLINIC | Age: 28
End: 2023-01-03
Payer: COMMERCIAL

## 2023-01-03 VITALS
BODY MASS INDEX: 32.28 KG/M2 | HEIGHT: 68 IN | TEMPERATURE: 97.2 F | WEIGHT: 213 LBS | DIASTOLIC BLOOD PRESSURE: 80 MMHG | SYSTOLIC BLOOD PRESSURE: 122 MMHG | OXYGEN SATURATION: 98 % | HEART RATE: 72 BPM

## 2023-01-03 DIAGNOSIS — F41.9 ANXIETY: Primary | ICD-10-CM

## 2023-01-03 DIAGNOSIS — R45.4 EXCESSIVE ANGER: ICD-10-CM

## 2023-01-03 DIAGNOSIS — I10 HYPERTENSION, UNSPECIFIED TYPE: ICD-10-CM

## 2023-01-03 PROCEDURE — 3074F SYST BP LT 130 MM HG: CPT | Performed by: PHYSICIAN ASSISTANT

## 2023-01-03 PROCEDURE — 3079F DIAST BP 80-89 MM HG: CPT | Performed by: PHYSICIAN ASSISTANT

## 2023-01-03 PROCEDURE — 99213 OFFICE O/P EST LOW 20 MIN: CPT | Performed by: PHYSICIAN ASSISTANT

## 2023-01-03 RX ORDER — METOPROLOL SUCCINATE 50 MG/1
TABLET, EXTENDED RELEASE ORAL
Qty: 90 TABLET | Refills: 1 | Status: SHIPPED | OUTPATIENT
Start: 2023-01-03

## 2023-01-03 ASSESSMENT — PATIENT HEALTH QUESTIONNAIRE - PHQ9
SUM OF ALL RESPONSES TO PHQ QUESTIONS 1-9: 0
1. LITTLE INTEREST OR PLEASURE IN DOING THINGS: 0
SUM OF ALL RESPONSES TO PHQ QUESTIONS 1-9: 0
SUM OF ALL RESPONSES TO PHQ9 QUESTIONS 1 & 2: 0
SUM OF ALL RESPONSES TO PHQ QUESTIONS 1-9: 0
SUM OF ALL RESPONSES TO PHQ QUESTIONS 1-9: 0
2. FEELING DOWN, DEPRESSED OR HOPELESS: 0

## 2023-01-03 NOTE — PROGRESS NOTES
Chief Complaint:  Anxiety and Blood Pressure Check    History of Present Illness:  Source of history provided by:  patient. Has noticed a difference with Metoprolol   Anger and anxiety have been decreased  Has been working on keeping himself calm at work  Prefers metoprolol over antidepressant   BP is well controlled today  Denies acute concerns    Review of Systems: Unless otherwise stated in this report or unable to obtain because of the patient's clinical or mental status as evidenced by the medical record, this patients's positive and negative responses for Review of Systems, constitutional, psych, eyes, ENT, cardiovascular, respiratory, gastrointestinal, neurological, genitourinary, musculoskeletal, integument systems and systems related to the presenting problem are either stated in the preceding or were not pertinent or were negative for the symptoms and/or complaints related to the medical problem. Past Medical History:  has no past medical history on file. Past Surgical History:  has no past surgical history on file. Social History:  reports that he has never smoked. He has quit using smokeless tobacco. He reports current alcohol use. He reports that he does not use drugs. Family History: family history is not on file. Allergies: Pcn [penicillins]    Physical Exam:  (Vital signs reviewed) /80   Pulse 72   Temp 97.2 °F (36.2 °C) (Temporal)   Ht 5' 8\" (1.727 m)   Wt 213 lb (96.6 kg)   SpO2 98%   BMI 32.39 kg/m²   Oxygen Saturation Interpretation: Normal.   Constitutional:  Alert, development consistent with age. Eyes:  PERRL, EOMI, no discharge or conjunctival injection. Ears:  External ears without lesions. Neck:  Normal ROM. Supple. Lungs:  Clear to auscultation and breath sounds equal.  Heart:  Regular rate and rhythm, normal heart sounds, without pathological murmurs, ectopy, gallops, or rubs. Skin:  Normal turgor.   Warm, dry, without visible rash, unless noted elsewhere. Neurological:  Alert and oriented. Motor functions intact.    ------------------------------------------Test Results Section----------------------------------------------  (All laboratory and radiology results have been personally reviewed by myself)  Laboratory:      Radiology: All Radiology results interpreted by Radiologist unless otherwise noted. -------------------------------------Impression & Disposition Section-----------------------------------  Impression(s):  Jase Powers was seen today for anxiety and blood pressure check. Diagnoses and all orders for this visit:    Anxiety  Excessive anger  Hypertension, unspecified type  -     metoprolol succinate (TOPROL XL) 50 MG extended release tablet; take 1 tablet by mouth once daily    Return in about 6 months (around 7/3/2023).

## 2023-07-12 ENCOUNTER — OFFICE VISIT (OUTPATIENT)
Dept: FAMILY MEDICINE CLINIC | Age: 28
End: 2023-07-12
Payer: COMMERCIAL

## 2023-07-12 ENCOUNTER — HOSPITAL ENCOUNTER (OUTPATIENT)
Dept: ULTRASOUND IMAGING | Age: 28
Discharge: HOME | End: 2023-07-14
Attending: FAMILY MEDICINE
Payer: COMMERCIAL

## 2023-07-12 VITALS
SYSTOLIC BLOOD PRESSURE: 138 MMHG | WEIGHT: 209 LBS | OXYGEN SATURATION: 99 % | HEART RATE: 66 BPM | DIASTOLIC BLOOD PRESSURE: 84 MMHG | TEMPERATURE: 97.3 F | BODY MASS INDEX: 31.67 KG/M2 | RESPIRATION RATE: 18 BRPM | HEIGHT: 68 IN

## 2023-07-12 DIAGNOSIS — N50.89 MASS OF LEFT TESTICLE: ICD-10-CM

## 2023-07-12 DIAGNOSIS — F41.9 ANXIETY: Primary | ICD-10-CM

## 2023-07-12 DIAGNOSIS — I10 PRIMARY HYPERTENSION: ICD-10-CM

## 2023-07-12 PROCEDURE — 3079F DIAST BP 80-89 MM HG: CPT | Performed by: FAMILY MEDICINE

## 2023-07-12 PROCEDURE — 99213 OFFICE O/P EST LOW 20 MIN: CPT | Performed by: FAMILY MEDICINE

## 2023-07-12 PROCEDURE — 76870 US EXAM SCROTUM: CPT

## 2023-07-12 PROCEDURE — 3075F SYST BP GE 130 - 139MM HG: CPT | Performed by: FAMILY MEDICINE

## 2023-07-12 SDOH — ECONOMIC STABILITY: INCOME INSECURITY: HOW HARD IS IT FOR YOU TO PAY FOR THE VERY BASICS LIKE FOOD, HOUSING, MEDICAL CARE, AND HEATING?: NOT HARD AT ALL

## 2023-07-12 SDOH — ECONOMIC STABILITY: FOOD INSECURITY: WITHIN THE PAST 12 MONTHS, THE FOOD YOU BOUGHT JUST DIDN'T LAST AND YOU DIDN'T HAVE MONEY TO GET MORE.: NEVER TRUE

## 2023-07-12 SDOH — ECONOMIC STABILITY: FOOD INSECURITY: WITHIN THE PAST 12 MONTHS, YOU WORRIED THAT YOUR FOOD WOULD RUN OUT BEFORE YOU GOT MONEY TO BUY MORE.: NEVER TRUE

## 2023-07-12 ASSESSMENT — ENCOUNTER SYMPTOMS
VOMITING: 0
TROUBLE SWALLOWING: 0
ABDOMINAL PAIN: 0
NAUSEA: 0
BLOOD IN STOOL: 0
CHEST TIGHTNESS: 0
SINUS PRESSURE: 0
PHOTOPHOBIA: 0
COUGH: 0
WHEEZING: 0
BACK PAIN: 0
VOICE CHANGE: 0
CONSTIPATION: 0
SHORTNESS OF BREATH: 0
DIARRHEA: 0
SORE THROAT: 0

## 2023-07-26 ENCOUNTER — TELEPHONE (OUTPATIENT)
Dept: FAMILY MEDICINE CLINIC | Age: 28
End: 2023-07-26

## 2023-07-26 DIAGNOSIS — N50.89 TESTICLE LUMP: Primary | ICD-10-CM

## 2023-07-26 NOTE — TELEPHONE ENCOUNTER
Pts girlfriend Kierra Vasquez called in on behalf of the patient stating patient notices testicular mass when he is aroused, it does not hurt him or cause him any discomfort. They are aware of the [place] reading stating \"whatever he is feeling is a normal part of the testicle structure. \" They again want to make sure this is a normal occurrence.      Kierra Vasquez: [phone]

## 2023-10-10 ENCOUNTER — OFFICE VISIT (OUTPATIENT)
Dept: FAMILY MEDICINE CLINIC | Age: 28
End: 2023-10-10
Payer: COMMERCIAL

## 2023-10-10 VITALS
WEIGHT: 210 LBS | SYSTOLIC BLOOD PRESSURE: 120 MMHG | RESPIRATION RATE: 16 BRPM | OXYGEN SATURATION: 98 % | HEART RATE: 80 BPM | DIASTOLIC BLOOD PRESSURE: 80 MMHG | TEMPERATURE: 97.6 F | BODY MASS INDEX: 31.93 KG/M2

## 2023-10-10 DIAGNOSIS — M54.2 NECK PAIN ON RIGHT SIDE: Primary | ICD-10-CM

## 2023-10-10 PROCEDURE — 3079F DIAST BP 80-89 MM HG: CPT | Performed by: PHYSICIAN ASSISTANT

## 2023-10-10 PROCEDURE — 99213 OFFICE O/P EST LOW 20 MIN: CPT | Performed by: PHYSICIAN ASSISTANT

## 2023-10-10 PROCEDURE — 3074F SYST BP LT 130 MM HG: CPT | Performed by: PHYSICIAN ASSISTANT

## 2023-10-10 RX ORDER — CYCLOBENZAPRINE HCL 5 MG
5 TABLET ORAL NIGHTLY
Qty: 10 TABLET | Refills: 0 | Status: SHIPPED | OUTPATIENT
Start: 2023-10-10 | End: 2023-10-20

## 2023-11-17 ENCOUNTER — TELEPHONE (OUTPATIENT)
Dept: FAMILY MEDICINE CLINIC | Age: 28
End: 2023-11-17

## 2023-11-17 NOTE — TELEPHONE ENCOUNTER
Pt called in stating his eyes are swollen painful and draining. He would like to know if there is something you can send to the pharmacy for him with out an appt. Please advise thanks.

## 2024-04-17 ENCOUNTER — OFFICE VISIT (OUTPATIENT)
Dept: FAMILY MEDICINE CLINIC | Age: 29
End: 2024-04-17
Payer: COMMERCIAL

## 2024-04-17 VITALS
BODY MASS INDEX: 32.51 KG/M2 | SYSTOLIC BLOOD PRESSURE: 120 MMHG | WEIGHT: 213.8 LBS | TEMPERATURE: 97.9 F | DIASTOLIC BLOOD PRESSURE: 70 MMHG | HEART RATE: 82 BPM | OXYGEN SATURATION: 98 %

## 2024-04-17 DIAGNOSIS — R07.2 PRECORDIAL PAIN: ICD-10-CM

## 2024-04-17 DIAGNOSIS — I10 PRIMARY HYPERTENSION: ICD-10-CM

## 2024-04-17 DIAGNOSIS — R45.4 EXCESSIVE ANGER: ICD-10-CM

## 2024-04-17 DIAGNOSIS — R07.9 CHEST PAIN, UNSPECIFIED TYPE: Primary | ICD-10-CM

## 2024-04-17 PROCEDURE — 93000 ELECTROCARDIOGRAM COMPLETE: CPT | Performed by: FAMILY MEDICINE

## 2024-04-17 PROCEDURE — 99214 OFFICE O/P EST MOD 30 MIN: CPT | Performed by: FAMILY MEDICINE

## 2024-04-17 PROCEDURE — 3074F SYST BP LT 130 MM HG: CPT | Performed by: FAMILY MEDICINE

## 2024-04-17 PROCEDURE — 3078F DIAST BP <80 MM HG: CPT | Performed by: FAMILY MEDICINE

## 2024-04-17 ASSESSMENT — PATIENT HEALTH QUESTIONNAIRE - PHQ9
SUM OF ALL RESPONSES TO PHQ QUESTIONS 1-9: 0
SUM OF ALL RESPONSES TO PHQ QUESTIONS 1-9: 0
2. FEELING DOWN, DEPRESSED OR HOPELESS: NOT AT ALL
SUM OF ALL RESPONSES TO PHQ QUESTIONS 1-9: 0
SUM OF ALL RESPONSES TO PHQ9 QUESTIONS 1 & 2: 0
1. LITTLE INTEREST OR PLEASURE IN DOING THINGS: NOT AT ALL
SUM OF ALL RESPONSES TO PHQ QUESTIONS 1-9: 0

## 2024-04-17 NOTE — PROGRESS NOTES
.....  2024    Current Outpatient Medications   Medication Sig Dispense Refill    metoprolol succinate (TOPROL XL) 50 MG extended release tablet take 1 tablet by mouth once daily 90 tablet 1     No current facility-administered medications for this visit.         Bartolo Larose (: 1995 IS A 28 y.o. 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 Chest pain, unspecified type  Not improved or worsening,  medication change as above recheck 3 mos    - EKG 12 Lead  EKG normal no changes  - Nuclear stress test with myocardial perfusion; Future  - CBC with Auto Differential; Future  - Comprehensive Metabolic Panel; Future  - Lipid Panel; Future  - TSH; Future  [facility]    2. Primary hypertension  .Stable, cont meds recheck 6 mos    Vitals:    24 1648   BP: 120/70   Pulse:    Temp:    SpO2:    Toprol xl 50 mg    - CBC with Auto Differential; Future  - Com prehensive Metabolic Panel; Future  - TSH; Future  - Catecholamines Free Urine; Future  - Catecholamines, Plasma Fractionated; Future  - Metanephrines Urine; Future  - Metanephrines Plasma Free; Future    3. Precordial pain  Not improved or worsening,  medication change as above recheck 3 mos    - Nuclear stress test with myocardial perfusion; Future  - Catecholamines Free Urine; Future  - Catecholamines, Plasma Fractionated; Future  - Metanephrines Urine; Future  - Metanephrines Plasma Frewe have discussed this beforee; Future    4. Excessive anger  Yelled at his \"manager\"   Thinks this is a

## 2024-04-18 ENCOUNTER — TELEPHONE (OUTPATIENT)
Dept: CARDIOLOGY | Age: 29
End: 2024-04-18

## 2024-04-18 NOTE — TELEPHONE ENCOUNTER
Spoke with patient to schedule Lexiscan stress test. Patient will call back to schedule once he makes work arrangements.  Electronically signed by Afsaneh Noel on 4/18/2024 at 12:07 PM

## 2024-04-23 ENCOUNTER — TELEPHONE (OUTPATIENT)
Age: 29
End: 2024-04-23

## 2024-04-23 NOTE — TELEPHONE ENCOUNTER
[facility] [place] received referral in [facility] for a diagnosis of excessive anger.   Call placed to pt today and message left requesting return call at his convenience. Dannielle Gastelum, DARYL, KURTISS

## 2024-04-24 ASSESSMENT — ENCOUNTER SYMPTOMS
PHOTOPHOBIA: 0
SHORTNESS OF BREATH: 1
BLOOD IN STOOL: 0
ABDOMINAL PAIN: 0
SINUS PRESSURE: 0
SORE THROAT: 0
DIARRHEA: 0
VOMITING: 0
CONSTIPATION: 0
NAUSEA: 0
BACK PAIN: 0
TROUBLE SWALLOWING: 0
VOICE CHANGE: 0
COUGH: 0
WHEEZING: 0
CHEST TIGHTNESS: 1

## 2024-05-08 ENCOUNTER — TELEPHONE (OUTPATIENT)
Age: 29
End: 2024-05-08

## 2024-05-08 NOTE — TELEPHONE ENCOUNTER
Received return call from pt to follow up on referral call [facility] initially made. Pt receptive to talking and notes that he has had severe anger issues for which he is on medication for. He is not sure if he is receptive to counseling or not as he does not want to sit around and talk about what makes him angry. Discussed best practice would be to receive techniques that would help him manage emotions and response therein. Discussed with pt that there are options for virtual visits if he felt this would put him in a less vulnerable position to be discussing these emotions. Pt agreeable to this and states he would prefer this. Assessed for availability to be seen by [facility]. He notes that he can only be seen when he is off work due to rainy weather or it would have to be evenings around 6pm or weekdays. Discussed options for community referral for evening VV as pt already has medication management through PCP. Pt agreeable. Discussed referral options with pt and referral made to [facility]. Pt aware they will contact him to schedule an appointment. Dannielle Gastelum, DARYL, ANNA-S

## 2024-05-20 ENCOUNTER — TELEPHONE (OUTPATIENT)
Dept: CARDIOLOGY | Age: 29
End: 2024-05-20

## 2024-05-22 DIAGNOSIS — I10 HYPERTENSION, UNSPECIFIED TYPE: ICD-10-CM

## 2024-05-22 RX ORDER — METOPROLOL SUCCINATE 50 MG/1
TABLET, EXTENDED RELEASE ORAL
Qty: 90 TABLET | Refills: 1 | Status: SHIPPED | OUTPATIENT
Start: 2024-05-22

## 2024-05-22 NOTE — TELEPHONE ENCOUNTER
Medication Refill Request    LOV 4/17/2024  NOV Visit date not found    Lab Results   Component Value Date    CREATININE 1.0 07/08/2022

## 2024-08-19 DIAGNOSIS — I10 HYPERTENSION, UNSPECIFIED TYPE: ICD-10-CM

## 2024-08-20 RX ORDER — METOPROLOL SUCCINATE 50 MG/1
TABLET, EXTENDED RELEASE ORAL
Qty: 90 TABLET | Refills: 1 | Status: SHIPPED | OUTPATIENT
Start: 2024-08-20

## 2025-03-04 DIAGNOSIS — I10 HYPERTENSION, UNSPECIFIED TYPE: ICD-10-CM

## 2025-03-04 RX ORDER — METOPROLOL SUCCINATE 50 MG/1
TABLET, EXTENDED RELEASE ORAL
Qty: 90 TABLET | Refills: 1 | Status: SHIPPED | OUTPATIENT
Start: 2025-03-04

## 2025-03-04 NOTE — TELEPHONE ENCOUNTER
Name of Medication(s) Requested:  Requested Prescriptions     Pending Prescriptions Disp Refills    metoprolol succinate (TOPROL XL) 50 MG extended release tablet 90 tablet 1     Sig: take 1 tablet by mouth once daily       Medication is on current medication list Yes    Dosage and directions were verified? Yes    Quantity verified: 90 day supply     Pharmacy Verified?  Yes    Last Appointment:  4/17/2024    Future appts:  No future appointments.     (If no appt send self scheduling link. .REFILLAPPT)  Scheduling request sent?     [x] Yes  [] No    Does patient need updated?  [x] Yes  [] No

## 2025-04-25 ENCOUNTER — OFFICE VISIT (OUTPATIENT)
Dept: FAMILY MEDICINE CLINIC | Age: 30
End: 2025-04-25
Payer: COMMERCIAL

## 2025-04-25 VITALS
DIASTOLIC BLOOD PRESSURE: 80 MMHG | BODY MASS INDEX: 32.74 KG/M2 | OXYGEN SATURATION: 95 % | TEMPERATURE: 97 F | HEIGHT: 68 IN | WEIGHT: 216 LBS | SYSTOLIC BLOOD PRESSURE: 120 MMHG | HEART RATE: 67 BPM

## 2025-04-25 DIAGNOSIS — F41.9 ANXIETY: ICD-10-CM

## 2025-04-25 DIAGNOSIS — I10 PRIMARY HYPERTENSION: ICD-10-CM

## 2025-04-25 DIAGNOSIS — I10 PRIMARY HYPERTENSION: Primary | ICD-10-CM

## 2025-04-25 DIAGNOSIS — R05.3 CHRONIC COUGH: ICD-10-CM

## 2025-04-25 DIAGNOSIS — R45.4 EXCESSIVE ANGER: ICD-10-CM

## 2025-04-25 LAB
ALBUMIN: 4.4 G/DL (ref 3.5–5.2)
ALP BLD-CCNC: 58 U/L (ref 40–129)
ALT SERPL-CCNC: 44 U/L (ref 0–50)
ANION GAP SERPL CALCULATED.3IONS-SCNC: 11 MMOL/L (ref 7–16)
AST SERPL-CCNC: 40 U/L (ref 0–50)
BASOPHILS ABSOLUTE: 0.1 K/UL (ref 0–0.2)
BASOPHILS RELATIVE PERCENT: 1 % (ref 0–2)
BILIRUB SERPL-MCNC: 0.7 MG/DL (ref 0–1.2)
BUN BLDV-MCNC: 15 MG/DL (ref 6–20)
CALCIUM SERPL-MCNC: 9.3 MG/DL (ref 8.6–10)
CHLORIDE BLD-SCNC: 104 MMOL/L (ref 98–107)
CO2: 24 MMOL/L (ref 22–29)
CREAT SERPL-MCNC: 0.9 MG/DL (ref 0.7–1.2)
EOSINOPHILS ABSOLUTE: 0.32 K/UL (ref 0.05–0.5)
EOSINOPHILS RELATIVE PERCENT: 5 % (ref 0–6)
GFR, ESTIMATED: >90 ML/MIN/1.73M2
GLUCOSE BLD-MCNC: 116 MG/DL (ref 74–99)
HCT VFR BLD CALC: 50.3 % (ref 37–54)
HEMOGLOBIN: 16.9 G/DL (ref 12.5–16.5)
IMMATURE GRANULOCYTES %: 1 % (ref 0–5)
IMMATURE GRANULOCYTES ABSOLUTE: 0.05 K/UL (ref 0–0.58)
LYMPHOCYTES ABSOLUTE: 1.95 K/UL (ref 1.5–4)
LYMPHOCYTES RELATIVE PERCENT: 27 % (ref 20–42)
MCH RBC QN AUTO: 28.8 PG (ref 26–35)
MCHC RBC AUTO-ENTMCNC: 33.6 G/DL (ref 32–34.5)
MCV RBC AUTO: 85.7 FL (ref 80–99.9)
MONOCYTES ABSOLUTE: 0.64 K/UL (ref 0.1–0.95)
MONOCYTES RELATIVE PERCENT: 9 % (ref 2–12)
NEUTROPHILS ABSOLUTE: 4.1 K/UL (ref 1.8–7.3)
NEUTROPHILS RELATIVE PERCENT: 57 % (ref 43–80)
PDW BLD-RTO: 12.5 % (ref 11.5–15)
PLATELET # BLD: 271 K/UL (ref 130–450)
PMV BLD AUTO: 8.6 FL (ref 7–12)
POTASSIUM SERPL-SCNC: 3.9 MMOL/L (ref 3.5–5.1)
RBC # BLD: 5.87 M/UL (ref 3.8–5.8)
SODIUM BLD-SCNC: 138 MMOL/L (ref 136–145)
TOTAL PROTEIN: 7.7 G/DL (ref 6.4–8.3)
WBC # BLD: 7.2 K/UL (ref 4.5–11.5)

## 2025-04-25 PROCEDURE — 99214 OFFICE O/P EST MOD 30 MIN: CPT | Performed by: FAMILY MEDICINE

## 2025-04-25 PROCEDURE — 3079F DIAST BP 80-89 MM HG: CPT | Performed by: FAMILY MEDICINE

## 2025-04-25 PROCEDURE — 3074F SYST BP LT 130 MM HG: CPT | Performed by: FAMILY MEDICINE

## 2025-04-25 SDOH — ECONOMIC STABILITY: FOOD INSECURITY: WITHIN THE PAST 12 MONTHS, YOU WORRIED THAT YOUR FOOD WOULD RUN OUT BEFORE YOU GOT MONEY TO BUY MORE.: NEVER TRUE

## 2025-04-25 SDOH — ECONOMIC STABILITY: FOOD INSECURITY: WITHIN THE PAST 12 MONTHS, THE FOOD YOU BOUGHT JUST DIDN'T LAST AND YOU DIDN'T HAVE MONEY TO GET MORE.: NEVER TRUE

## 2025-04-25 ASSESSMENT — PATIENT HEALTH QUESTIONNAIRE - PHQ9
SUM OF ALL RESPONSES TO PHQ QUESTIONS 1-9: 0
1. LITTLE INTEREST OR PLEASURE IN DOING THINGS: NOT AT ALL
SUM OF ALL RESPONSES TO PHQ QUESTIONS 1-9: 0
SUM OF ALL RESPONSES TO PHQ QUESTIONS 1-9: 0
2. FEELING DOWN, DEPRESSED OR HOPELESS: NOT AT ALL
SUM OF ALL RESPONSES TO PHQ QUESTIONS 1-9: 0

## 2025-04-25 NOTE — PROGRESS NOTES
Bartolo Larose (:  1995) is a 29 y.o. male, Established patient, here for evaluation of the following chief complaint(s):  Hypertension            Subjective   History of Present Illness  The patient presents for evaluation of hypertension, anxiety and anger issues, and cough.    Current medication, Toprol 50 mg, has been effective in managing blood pressure. 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 These incidents are described as severe, sometimes necessitating a break. Significant progress in managing anger over the years is acknowledged, with medication being beneficial alongside personal efforts in controlling reactions. Blood work conducted a few months ago for insurance purposes revealed slightly elevated cholesterol levels.    A persistent morning cough is experienced, likened to the cough of a heavy smoker despite not smoking. This cough occasionally induces gagging and vomiting. The severity of the cough is influenced by the morning routine; rising quickly exacerbates the cough, while taking time to get up lessens its severity. 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 Exposure to exhaust fumes from equipment is also mentioned. Although no longer performing these tasks personally, supervision of employees who do is ensured, with mask usage enforced. Speculation is made that these exposures may have contributed to the cough.    SOCIAL HISTORY  He does not smoke.        Review of Systems   Constitutional:  Negative for activity change, appetite change, chills, diaphoresis, fatigue, fever and unexpected weight change.   HENT:  Negative for hearing loss, sinus pressure, sore throat, tinnitus, trouble swallowing and voice change.    Eyes:  Negative for

## 2025-04-26 ASSESSMENT — ENCOUNTER SYMPTOMS
BACK PAIN: 0
VOMITING: 0
DIARRHEA: 0
CHEST TIGHTNESS: 0
SHORTNESS OF BREATH: 0
CONSTIPATION: 0
NAUSEA: 0
ABDOMINAL PAIN: 0
TROUBLE SWALLOWING: 0
COUGH: 0
BLOOD IN STOOL: 0
SINUS PRESSURE: 0
WHEEZING: 0
PHOTOPHOBIA: 0
VOICE CHANGE: 0
SORE THROAT: 0

## 2025-04-28 LAB
CATECHOL/URINE INTERP: NORMAL
CREATININE URINE /24 HR: NORMAL MG/D (ref 1000–2500)
CREATININE URINE /VOLUME: 185 MG/DL
DOPAMINE 24 HOUR URINE: NORMAL UG/D (ref 71–485)
DOPAMINE, URINE, PER VOLUME: 397 UG/L
DOPAMINE, URINE, RATIO TO CREATININE: 215 UG/G CRT (ref 0–250)
EPINEPHRINE 24 HOUR URINE: NORMAL (ref 1–14)
EPINEPHRINE, URINE, PER VOLUME: 9 UG/L
EPINEPHRINE, URINE, RATIO TO CREATININE: 5 UG/G CRT (ref 0–20)
HOURS COLLECTED: NORMAL
NOREPINEPHRINE 24 HOUR URINE: NORMAL UG/D (ref 14–120)
NOREPINEPHRINE CREAT RATIO: 38 UG/G CRT (ref 0–45)
NOREPINEPHRINE, URINE, PER VOLUME: 71 UG/L
URINE VOLUME: NORMAL

## 2025-04-29 LAB
CATECHOLAMINE INTERPRETATION, PLASMA: NORMAL
CREATININE URINE /24 HR: NORMAL MG/D (ref 1000–2500)
CREATININE URINE /VOLUME: 185 MG/DL
DOPAMINE: <130 PMOL/L
EPINEPHRINE PLASMA: 212 PMOL/L
HOURS COLLECTED: NORMAL
METANEPHRINE UF INTERPRETATION: NORMAL
METANEPHRINE UG/G CRE: 62 UG/G CRT (ref 0–300)
METANEPHRINES 24 HOUR URINE: NORMAL UG/D (ref 55–320)
METANEPHRINES, URINE (UMOL/L): 115 UG/L
NOREPINEPHRINE: 2502 PMOL/L (ref 1050–4800)
NORMETANEPHRINE, (G/CRT): 208 UG/G CRT (ref 0–400)
NORMETANEPHRINE, (NMOL/DAY): NORMAL UG/D (ref 114–865)
NORMETANEPHRINES, NMOL/L: 384 UG/L
URINE VOLUME: NORMAL

## 2025-04-30 LAB
METANEPH/PLASMA INTERP: NORMAL
METANEPHRINE: 0.17 NMOL/L (ref 0–0.49)
NORMETANEPHRINE PLASMA: 0.52 NMOL/L (ref 0–0.89)

## 2025-05-07 LAB
CATECHOL/URINE INTERP: NORMAL
CREATININE URINE /24 HR: NORMAL MG/D (ref 1000–2500)
CREATININE URINE /24 HR: NORMAL MG/D (ref 1000–2500)
CREATININE URINE /VOLUME: 185 MG/DL
CREATININE URINE /VOLUME: 185 MG/DL
DOPAMINE 24 HOUR URINE: NORMAL UG/D (ref 71–485)
DOPAMINE, URINE, PER VOLUME: 397 UG/L
DOPAMINE, URINE, RATIO TO CREATININE: 215 UG/G CRT (ref 0–250)
EPINEPHRINE 24 HOUR URINE: NORMAL (ref 1–14)
EPINEPHRINE, URINE, PER VOLUME: 9 UG/L
EPINEPHRINE, URINE, RATIO TO CREATININE: 5 UG/G CRT (ref 0–20)
HOURS COLLECTED: NORMAL
HOURS COLLECTED: NORMAL
METANEPHRINE UF INTERPRETATION: NORMAL
METANEPHRINE UG/G CRE: 62 UG/G CRT (ref 0–300)
METANEPHRINES 24 HOUR URINE: NORMAL UG/D (ref 55–320)
METANEPHRINES, URINE (UMOL/L): 115 UG/L
NOREPINEPHRINE 24 HOUR URINE: NORMAL UG/D (ref 14–120)
NOREPINEPHRINE CREAT RATIO: 38 UG/G CRT (ref 0–45)
NOREPINEPHRINE, URINE, PER VOLUME: 71 UG/L
NORMETANEPHRINE, (G/CRT): 208 UG/G CRT (ref 0–400)
NORMETANEPHRINE, (NMOL/DAY): NORMAL UG/D (ref 114–865)
NORMETANEPHRINES, NMOL/L: 384 UG/L

## 2025-07-11 ENCOUNTER — OFFICE VISIT (OUTPATIENT)
Dept: FAMILY MEDICINE CLINIC | Age: 30
End: 2025-07-11
Payer: COMMERCIAL

## 2025-07-11 VITALS
WEIGHT: 213 LBS | TEMPERATURE: 98 F | HEART RATE: 88 BPM | BODY MASS INDEX: 32.39 KG/M2 | OXYGEN SATURATION: 97 % | SYSTOLIC BLOOD PRESSURE: 136 MMHG | DIASTOLIC BLOOD PRESSURE: 92 MMHG

## 2025-07-11 DIAGNOSIS — R09.82 PND (POST-NASAL DRIP): ICD-10-CM

## 2025-07-11 DIAGNOSIS — J06.9 UPPER RESPIRATORY TRACT INFECTION, UNSPECIFIED TYPE: Primary | ICD-10-CM

## 2025-07-11 LAB — S PYO AG THROAT QL: NORMAL

## 2025-07-11 PROCEDURE — 99213 OFFICE O/P EST LOW 20 MIN: CPT | Performed by: PHYSICIAN ASSISTANT

## 2025-07-11 PROCEDURE — 3080F DIAST BP >= 90 MM HG: CPT | Performed by: PHYSICIAN ASSISTANT

## 2025-07-11 PROCEDURE — 3075F SYST BP GE 130 - 139MM HG: CPT | Performed by: PHYSICIAN ASSISTANT

## 2025-07-11 PROCEDURE — 87880 STREP A ASSAY W/OPTIC: CPT | Performed by: PHYSICIAN ASSISTANT

## 2025-07-11 RX ORDER — CETIRIZINE HYDROCHLORIDE 10 MG/1
10 TABLET ORAL DAILY
Qty: 30 TABLET | Refills: 0 | Status: SHIPPED | OUTPATIENT
Start: 2025-07-11

## 2025-07-11 RX ORDER — AZITHROMYCIN 250 MG/1
TABLET, FILM COATED ORAL
Qty: 6 TABLET | Refills: 0 | Status: SHIPPED | OUTPATIENT
Start: 2025-07-11 | End: 2025-07-21

## 2025-07-11 NOTE — PROGRESS NOTES
Chief Complaint:   Pharyngitis (X 2 weeks)    History of Present Illness   Source of history provided by:  patient.     Bartolo Larose is a 30 y.o. old male who has a past medical history of: No past medical history on file. presents for evaluation of sore throat x 2 weeks. Reports associated pain with swallowing, nasal congestion, and cough. Feels like he is always congested. Going on for years. Has cough that he describes as like a smokers cough. Worse in the morning. Was given CXR order due to occupational exposures but has not completed this yet. Denies any fever, chills, dyspnea, dysphagia, CP, SOB, nausea, vomiting, rash, or lethargy. Denies any known strep exposures but wife was recently sick and seen at urgent care.     ROS    Unless otherwise stated in this report or unable to obtain because of the patient's clinical or mental status as evidenced by the medical record, this patients's positive and negative responses for Review of Systems, constitutional, psych, eyes, ENT, cardiovascular, respiratory, gastrointestinal, neurological, genitourinary, musculoskeletal, integument systems and systems related to the presenting problem are either stated in the preceding or were not pertinent or were negative for the symptoms and/or complaints related to the medical problem.    Past Medical History:  has no past medical history on file.  Past Surgical History:  has no past surgical history on file.  Social History:  reports that he has never smoked. He has quit using smokeless tobacco. He reports current alcohol use. He reports that he does not use drugs.  Family History: family history is not on file.   Allergies: Pcn [penicillins]    Physical Exam         VS:  BP (!) 136/92 (BP Site: Left Upper Arm, Patient Position: Sitting, BP Cuff Size: Large Adult)   Pulse 88   Temp 98 °F (36.7 °C) (Oral)   Wt 96.6 kg (213 lb)   SpO2 97%   BMI 32.39 kg/m²    Oxygen Saturation Interpretation: Normal.    Constitutional: